# Patient Record
(demographics unavailable — no encounter records)

---

## 2024-11-12 NOTE — HISTORY OF PRESENT ILLNESS
[de-identified] : First-time visit for this 69-year-old gentleman he is here with complaint of chronic and worsening right medial knee pain.  Patient had recent MRI performed which indicates both degenerative findings of the patellofemoral articulation medial compartment as well as early meniscal tearing.  No jennifer large tear of the meniscus noted.  Patient states he has difficulty with stair climbing going up and down stairs and getting out of a seated position.  Patient continues to work he is very active.

## 2024-11-12 NOTE — PHYSICAL EXAM
[de-identified] : Right knee on exam today is definite warmth and swelling there is some definite medial joint line tenderness range of motion 0 to 125 degrees knee stable to stress varus valgus is with full extension and 90 degrees flexion. [de-identified] : Patient had radiographs recently he was able to retrieve the report of these images which indicated only mild degenerative changes of the medial as well as patellofemoral compartment.  We do not have the direct images to review however.

## 2024-11-12 NOTE — REASON FOR VISIT
[Initial Visit] : an initial visit for [Knee Pain] : knee pain [FreeTextEntry2] : RIGHT KNEE PAIN, POSSIBLE MENISCUS TEAR. HAS MRI DONE.

## 2024-11-12 NOTE — PROCEDURE
[de-identified] : [facility] [id number] LOT # [id number] EXP 07/25 1% 500MG/50ML   KENALOG-10 [employer] NDC 4978-4401-47 LOT # [id number] EXP 1/26 50MG/5ML  Patient on cortisone injection lateral part of the right knee today this is under sterile conditions.  Patient tolerated injection well.

## 2024-11-12 NOTE — DISCUSSION/SUMMARY
[de-identified] : Patient I talked at length about the possible underlying etiology of his right medial knee pain.  Patient has MRI findings suggesting both a arthritic component as well as meniscal component.  After prolonged interview with the patient his history indicates that it seems to be more arthritic in nature with less of mechanical symptoms of meniscal pathology.  To that end patient was given a cortisone injection today.  He states that he has been placed on meloxicam 7.5 mg by another physician but takes it only sparingly I have advised him to increase it to twice a day for the next 5 days to help further reduce his discomfort he will also continually ice the knee.  Patient will also have a single dose HA injection ordered he will noted be notified once is available for use.  Patient also understands if pain of mechanical nature persist or worsens that that may be the indication that the larger source of his discomfort is the meniscal pathology and at that point we may need to consider arthroscopic surgery if symptoms persist despite these conservative measures.  This consultation lasted 40 minutes.

## 2024-12-10 NOTE — PROCEDURE
[de-identified] : MONOVISC [employer] NDC [address]-35 LOT # [id number] EXP 2027/05/22 4ML/22MG  Patient given a Monovisc injection of the lateral compartment of the right knee today.  This done in sterile conditions.  Patient tolerated injection well.

## 2024-12-10 NOTE — REASON FOR VISIT
[Follow-Up Visit] : a follow-up visit for [Knee Pain] : knee pain [FreeTextEntry2] : right knee monovisc gel injection

## 2024-12-10 NOTE — DISCUSSION/SUMMARY
[de-identified] : Patient I discussed at length further conservative measures can be undertaken help reduce right knee pain due to his advanced to moderate osteoarthritis.  In addition to today's HA injection patient can return every 6 months for cortisone injection if needed.  He was also advised to liberally ice the knee when symptomatic and take over-the-counter anti-inflammatory such as Advil or Tylenol when needed.  We also talked about the reasonable risk and benefits of knee replacement surgery including typical convalescent expectations and expectations for implant longevity.  Patient will consider this if he does not see any sustained symptomatic improvement with conservative measures.  We also addressed the fact the patient's current calculated BMI is 36.49 which places him into the diagnostic category of obese.  We talked at length about the need to engage in a weight/BMI reduction program help reduce his current symptoms also improve the efficacy of today's injection.  Furthermore in the setting of potential knee replacement surgery in the future patient was told that weight/BMI reduction would have a beneficial effect on promoting implant longevity and also in potentially reducing perioperative complications.  Today's consultation regarding the patient's diagnosis of obesity in the setting of potential knee replacement surgery lasted 30 minutes.  This 30 minutes exclusive of teaching time and any separately billed procedures.

## 2024-12-10 NOTE — PHYSICAL EXAM
[de-identified] : Today's calculated BMI is 36.49.  Right knee on exam today is definite warmth and swelling there is some definite medial joint line tenderness range of motion 0 to 125 degrees knee stable to stress varus valgus is with full extension and 90 degrees flexion.

## 2024-12-10 NOTE — HISTORY OF PRESENT ILLNESS
[de-identified] : Patient returns today discussed further conservative measures can be undertaken help reduce his right knee pain due to moderate to advanced knee osteoarthritis.  Patient is keen to avoid knee replacement surgery at this point in his life.

## 2025-04-25 NOTE — PHYSICAL EXAM
[de-identified] :  Right knee on exam today is definite warmth and swelling there is some definite medial joint line tenderness range of motion 0 to 125 degrees knee stable to stress varus valgus is with full extension and 90 degrees flexion.

## 2025-04-25 NOTE — PROCEDURE
[de-identified] : [facility] [id number] LOT # [id number] EXP 07/25 1% 500MG/50ML   KENALOG-10 [employer] NDC [id number] LOT # [id number] EXP 4/26 50MG/5ML  Patient given cortisone injection lateral part of the right knee this was done in sterile conditions and ultrasound guidance.  Patient tolerated injection well.

## 2025-04-25 NOTE — HISTORY OF PRESENT ILLNESS
[de-identified] : Well-known patient returns today he is complaining of resumption of right knee pain.  Patient has previously established diagnosis based on clinical exam history and radiographs of mild to moderate arthritis of the right knee.  Patient had recent exacerbation of his discomfort he is here for repeat evaluation probable cortisone injection.

## 2025-04-25 NOTE — DISCUSSION/SUMMARY
[de-identified] : Patient I discussed the underlying etiology of his right knee pain.  We indicated is probably a progression or aggravation of his underlying osteoarthritis which was previously diagnosed as mild to moderate.  Patient I discussed her options she agreed to a cortisone injection which was performed under ultrasound guidance and sterile technique.  Patient tolerated injection well.  He also agreed to having a repeat ordering of his single-dose HA injection and notify the patient once is available for use here in the office.  Patient also continue to ice the knee take over-the-counter anti-inflammatories when needed.  This consultation lasted 30 minutes exclusive teaching time and any separately billed procedures.

## 2025-05-20 NOTE — HISTORY OF PRESENT ILLNESS
[de-identified] : Established patient returns today discussed further conservative measures for managing and help with reduce his right knee pain due to moderate to advanced knee osteoarthritis.  Patient is keen to avoid knee replacement surgery at this point.  Patient is also here for single-dose HA injection of the right knee.

## 2025-05-20 NOTE — DISCUSSION/SUMMARY
[de-identified] : Patient I talked about further conservative measures he could be taken to reduce his current right knee discomfort due to moderate to advanced knee osteoarthritis.  In addition to today's single-dose HA injection patient can return for intermittent cortisone injections every 4 to 6 months if needed.  Patient was also told to liberally ice the knee when symptomatic and take nonsteroidal anti-inflammatories also as needed.  We also respect the patient's current calculated BMI of 36.49 places him into the obese category diagnostically.  We talked at length about need to engage in a weight/BMI reduction protocol to help reduce his current symptoms also improve the effects of today's injections.  Furthermore weight/BMI reduction in setting of potential knee replacement surgery shown to have a beneficial effect in both potentially reducing perioperative complications and also having a positive effect of implant longevity.  This consultation regarding patient's diagnosis of obesity in the setting of potential knee replacement surgery lasted 30 minutes exclusive teaching time and any separately billed procedures.

## 2025-05-20 NOTE — PHYSICAL EXAM
[de-identified] : Patient's most recent calculated BMI from April 25, 2025 is 36.49 kg/m. Right knee on exam today is definite warmth and swelling there is some definite medial joint line tenderness range of motion 0 to 125 degrees knee stable to stress varus valgus is with full extension and 90 degrees flexion.

## 2025-05-20 NOTE — PROCEDURE
[de-identified] : MONOVISC [employer] NDC [address]-17 LOT # [id number] EXP 2026/06/30 4ML/22MG  Patient given a Monovisc injection lateral compartment the right knee today this is done under sterile conditions and ultrasound guidance.  Patient tolerated the injection well.

## 2025-05-20 NOTE — REASON FOR VISIT
[Follow-Up Visit] : a follow-up visit for [Knee Pain] : knee pain [FreeTextEntry2] : RIGHT KNEE MONOVISC GEL NJECTION